# Patient Record
Sex: FEMALE | Race: WHITE | HISPANIC OR LATINO | Employment: OTHER | URBAN - METROPOLITAN AREA
[De-identification: names, ages, dates, MRNs, and addresses within clinical notes are randomized per-mention and may not be internally consistent; named-entity substitution may affect disease eponyms.]

---

## 2024-07-01 ENCOUNTER — OFFICE VISIT (OUTPATIENT)
Dept: DENTISTRY | Facility: CLINIC | Age: 69
End: 2024-07-01

## 2024-07-01 DIAGNOSIS — Z01.20 ENCOUNTER FOR DENTAL EXAMINATION: Primary | ICD-10-CM

## 2024-07-01 PROBLEM — I10 HYPERTENSION, ESSENTIAL: Status: ACTIVE | Noted: 2024-03-14

## 2024-07-01 PROBLEM — Z79.899 MEDICATION MANAGEMENT: Status: ACTIVE | Noted: 2024-03-14

## 2024-07-01 PROCEDURE — D0220 INTRAORAL - PERIAPICAL FIRST RADIOGRAPHIC IMAGE: HCPCS

## 2024-07-01 PROCEDURE — D0230 INTRAORAL - PERIAPICAL EACH ADDITIONAL RADIOGRAPHIC IMAGE: HCPCS

## 2024-07-01 PROCEDURE — D0191 ASSESSMENT OF A PATIENT: HCPCS

## 2024-07-01 PROCEDURE — D0274 BITEWINGS - 4 RADIOGRAPHIC IMAGES: HCPCS

## 2024-07-01 PROCEDURE — D1330 ORAL HYGIENE INSTRUCTIONS: HCPCS

## 2024-07-01 RX ORDER — ENALAPRIL MALEATE 10 MG/1
10 TABLET ORAL 2 TIMES DAILY
COMMUNITY
Start: 2024-03-14

## 2024-07-01 RX ORDER — BISACODYL 5 MG/1
TABLET, DELAYED RELEASE ORAL
COMMUNITY
Start: 2024-04-02

## 2024-07-01 NOTE — DENTAL PROCEDURE DETAILS
Assessment, 4bwx, PAs    CC: 68 yo patient presents on dental van at [facility]    Patient speaks Montenegrin, abbe helped w/ translation.  Used [employer] translate at end of appointment.    Reviewed meds/hhx in Epic. ASA2    4bwx and Pas taken, attempted to take FMX but internet slow  Full Mouth Perio charting completed  PERIO: to be determined once FMX is complete  Treatment Recommended: SRPs will be needed in clinic setting with FMX and aryan   Discussed periodontal disease with patient. Patient understands and agrees to all recommended treatment discussed.    NV: Patient to return on dental van for exam w/ dentist